# Patient Record
Sex: FEMALE | Race: WHITE | NOT HISPANIC OR LATINO
[De-identification: names, ages, dates, MRNs, and addresses within clinical notes are randomized per-mention and may not be internally consistent; named-entity substitution may affect disease eponyms.]

---

## 2022-07-28 ENCOUNTER — FORM ENCOUNTER (OUTPATIENT)
Age: 72
End: 2022-07-28

## 2022-07-29 ENCOUNTER — APPOINTMENT (OUTPATIENT)
Dept: MRI IMAGING | Facility: CLINIC | Age: 72
End: 2022-07-29

## 2022-07-29 ENCOUNTER — APPOINTMENT (OUTPATIENT)
Dept: ORTHOPEDIC SURGERY | Facility: CLINIC | Age: 72
End: 2022-07-29

## 2022-07-29 VITALS — BODY MASS INDEX: 30.19 KG/M2 | HEIGHT: 64.5 IN | WEIGHT: 179 LBS

## 2022-07-29 DIAGNOSIS — Z78.9 OTHER SPECIFIED HEALTH STATUS: ICD-10-CM

## 2022-07-29 PROBLEM — Z00.00 ENCOUNTER FOR PREVENTIVE HEALTH EXAMINATION: Status: ACTIVE | Noted: 2022-07-29

## 2022-07-29 PROCEDURE — 72040 X-RAY EXAM NECK SPINE 2-3 VW: CPT

## 2022-07-29 PROCEDURE — 72141 MRI NECK SPINE W/O DYE: CPT | Mod: MH

## 2022-07-29 PROCEDURE — 99203 OFFICE O/P NEW LOW 30 MIN: CPT

## 2022-07-29 NOTE — HISTORY OF PRESENT ILLNESS
[Neck] : neck [Gradual] : gradual [3] : 3 [Dull/Aching] : dull/aching [Intermittent] : intermittent [Leisure] : leisure [Rest] : rest [Exercising] : exercising [Retired] : Work status: retired [] : Post Surgical Visit: no

## 2022-07-29 NOTE — PHYSICAL EXAM
[Rotation to right] : rotation to right [Disc space narrowing] : Disc space narrowing [] : Numbness does not radiate to right arm with motion [FreeTextEntry3] : palpable fatty tissue cervicothoraco region [FreeTextEntry1] : midcervical arthritis

## 2022-08-02 ENCOUNTER — APPOINTMENT (OUTPATIENT)
Dept: ORTHOPEDIC SURGERY | Facility: CLINIC | Age: 72
End: 2022-08-02

## 2022-08-02 VITALS — WEIGHT: 179 LBS | HEIGHT: 64.5 IN | BODY MASS INDEX: 30.19 KG/M2

## 2022-08-02 DIAGNOSIS — M47.812 SPONDYLOSIS W/OUT MYELOPATHY OR RADICULOPATHY, CERVICAL REGION: ICD-10-CM

## 2022-08-02 PROCEDURE — 99214 OFFICE O/P EST MOD 30 MIN: CPT

## 2022-08-02 NOTE — ASSESSMENT
[FreeTextEntry1] : MRI results discussed; she feels much better and wants to continue with home exercises and advil as needed. Follow up if needed

## 2022-08-02 NOTE — HISTORY OF PRESENT ILLNESS
[Neck] : neck [Gradual] : gradual [0] : 0 [Dull/Aching] : dull/aching [Intermittent] : intermittent [Rest] : rest [Meds] : meds [Retired] : Work status: retired [] : Post Surgical Visit: no [de-identified] : MRI

## 2022-08-02 NOTE — REASON FOR VISIT
[FreeTextEntry2] : Patient feels  improvement since last visit. Patient has been taking Advil and doing Home exercise which has been helping. She would like to go over MRI results today: cervical stenosis with herniated discs. No c/o radicular pain or N/T arms.